# Patient Record
Sex: FEMALE | Race: BLACK OR AFRICAN AMERICAN | NOT HISPANIC OR LATINO | Employment: UNEMPLOYED | URBAN - METROPOLITAN AREA
[De-identification: names, ages, dates, MRNs, and addresses within clinical notes are randomized per-mention and may not be internally consistent; named-entity substitution may affect disease eponyms.]

---

## 2024-06-23 ENCOUNTER — HOSPITAL ENCOUNTER (EMERGENCY)
Facility: HOSPITAL | Age: 39
Discharge: HOME | End: 2024-06-23
Attending: EMERGENCY MEDICINE

## 2024-06-23 VITALS
TEMPERATURE: 98 F | HEART RATE: 72 BPM | SYSTOLIC BLOOD PRESSURE: 139 MMHG | DIASTOLIC BLOOD PRESSURE: 95 MMHG | RESPIRATION RATE: 18 BRPM | OXYGEN SATURATION: 100 %

## 2024-06-23 DIAGNOSIS — M20.11 HALLUX VALGUS (ACQUIRED), RIGHT FOOT: ICD-10-CM

## 2024-06-23 DIAGNOSIS — M76.811 ANTERIOR TIBIALIS TENDINITIS OF RIGHT LOWER EXTREMITY: Primary | ICD-10-CM

## 2024-06-23 PROCEDURE — 99284 EMERGENCY DEPT VISIT MOD MDM: CPT | Mod: 25

## 2024-06-23 PROCEDURE — 96372 THER/PROPH/DIAG INJ SC/IM: CPT | Performed by: EMERGENCY MEDICINE

## 2024-06-23 PROCEDURE — 63600175 PHARM REV CODE 636 W HCPCS: Performed by: EMERGENCY MEDICINE

## 2024-06-23 RX ORDER — KETOROLAC TROMETHAMINE 30 MG/ML
15 INJECTION, SOLUTION INTRAMUSCULAR; INTRAVENOUS
Status: COMPLETED | OUTPATIENT
Start: 2024-06-23 | End: 2024-06-23

## 2024-06-23 RX ADMIN — KETOROLAC TROMETHAMINE 15 MG: 30 INJECTION, SOLUTION INTRAMUSCULAR; INTRAVENOUS at 03:06

## 2024-06-23 NOTE — ED PROVIDER NOTES
Encounter Date: 6/23/2024       History     Chief Complaint   Patient presents with    Leg Pain     Patient reports right leg pain for some time, but now worse.     38-year-old female presents with complaint to right lower leg and medial side of right foot.  The pain is worse with ambulation.  She has been more active and busy recently and the pain is worsened.  It is worse with ambulation.  It was not completely relieved with rest but much improved.  She has not having fever chills.  She denies trauma.  She denies prolonged      Review of patient's allergies indicates:  Not on File  History reviewed. No pertinent past medical history.  History reviewed. No pertinent surgical history.  No family history on file.     Review of Systems    Physical Exam     Initial Vitals [06/23/24 0052]   BP Pulse Resp Temp SpO2   (!) 139/95 72 18 97.5 °F (36.4 °C) 100 %      MAP       --         Physical Exam    Nursing note and vitals reviewed.  Constitutional: She appears well-developed and well-nourished.   HENT:   Head: Normocephalic and atraumatic.   Eyes: Conjunctivae and EOM are normal. Pupils are equal, round, and reactive to light.   Cardiovascular:  Intact distal pulses.           2+ DP and PT pulses bilaterally   Musculoskeletal:      Comments: Soft compartments to right lower leg   No palpable defect or dysfunction to extensor mechanism at right calf   Pain and tibialis anterior distribution with passive dorsiflexion  Hallux valgus deformity with overlying tenderness to palpation without erythema, edema, warmth, fluctuance noted           ED Course   Procedures  Labs Reviewed   HIV 1 / 2 ANTIBODY   HEPATITIS C ANTIBODY          Imaging Results    None          Medications   ketorolac injection 15 mg (15 mg Intramuscular Given 6/23/24 0314)     Medical Decision Making  Examination is consistent with tibialis anterior tendinitis.  Considered compartment syndrome, occult fracture or dislocation, peripheral vascular disease,  and DVT.  However, examination and clinical course are not consistent with same.  Pain and tibialis anterior distribution with passive dorsiflexion is more consistent with tibialis anterior tendinitis.  Will treat with same including rest, ice, compression, elevation, and NSAIDs.  Patient also has a hallux valgus of the right foot with the tendon to pain.  I referred her to Podiatry and counseled her on supportive measures at home.    Risk  OTC drugs.  Minor surgery with no identified risk factors.                                      Clinical Impression:  Final diagnoses:  [M76.811] Anterior tibialis tendinitis of right lower extremity (Primary)  [M20.11] Hallux valgus (acquired), right foot                 Sessions, Jose Guadalupe GREEN MD  06/23/24 0116

## 2024-06-23 NOTE — ED TRIAGE NOTES
"Patient reports pain in right leg from ankle into calf. States that she had an "eventful 3 days." Which could have aggravated the pain she has been having for a while.  "

## 2024-06-23 NOTE — DISCHARGE INSTRUCTIONS
Return to the emergency department if any new or concerning symptoms occur    Take over the counter acetaminophen 1000mg or ibuprofen 600mg every 6 hours as needed for pain and inflammation    Apply ice to your right ankle in the area of greatest pain for 15 minutes every 3 hours to help relieve your symptoms.    The podiatry clinic will contact you to help arrange follow-up for your foot     You should follow up with primary care for your ankle.